# Patient Record
Sex: FEMALE | Race: WHITE | NOT HISPANIC OR LATINO | URBAN - METROPOLITAN AREA
[De-identification: names, ages, dates, MRNs, and addresses within clinical notes are randomized per-mention and may not be internally consistent; named-entity substitution may affect disease eponyms.]

---

## 2021-08-17 ENCOUNTER — OFFICE VISIT - RIVER FALLS (OUTPATIENT)
Dept: FAMILY MEDICINE | Facility: CLINIC | Age: 21
End: 2021-08-17

## 2021-08-17 ASSESSMENT — MIFFLIN-ST. JEOR: SCORE: 1368.59

## 2021-09-02 ENCOUNTER — COMMUNICATION - RIVER FALLS (OUTPATIENT)
Dept: FAMILY MEDICINE | Facility: CLINIC | Age: 21
End: 2021-09-02

## 2022-02-11 VITALS
BODY MASS INDEX: 24.63 KG/M2 | RESPIRATION RATE: 16 BRPM | DIASTOLIC BLOOD PRESSURE: 78 MMHG | WEIGHT: 139 LBS | OXYGEN SATURATION: 96 % | HEIGHT: 63 IN | TEMPERATURE: 97.3 F | SYSTOLIC BLOOD PRESSURE: 109 MMHG | HEART RATE: 72 BPM

## 2022-02-16 NOTE — PROGRESS NOTES
Patient:   ARCADIO VEGA            MRN: [id number]            FIN: [id number]               Age:   21 years     Sex:  Female     :  2000   Associated Diagnoses:   Routine sports physical exam   Author:   Boaz Zafar PA-C      Results Review   General results      Health Status   Allergies:    Allergic Reactions (Selected)  No Known Medication Allergies   Medications:  (Selected)   , not on any regular medications   Problem list:    All Problems  Resolved: History of COVID-19 / SNOMED CT [id number]      Chief Complaint   2021 2:48 PM CDT    New pt here for Wrestling at [facility] in [facility]      Subjective   Here for sports physical for wrestling at [facility]  she is from [place], no immunization records are available  she did have Covid-19 in 2020, lasted 3 days, no lingering sxs         Histories   Past Medical History:    Resolved  History of COVID-19 ([id number]):  Resolved.   Family History:    Entire family history is negative.   Procedure history:    History of tonsillectomy ([id number]).  History of shoulder surgery ([id number]).  Comments:  2021 2:56 PM CDT - Aaron LANDRUM, Alex  Left shoulder   Social History:        Electronic Cigarette/Vaping Assessment            Electronic Cigarette Use: Never.      Tobacco Assessment            Never (less than 100 in lifetime)        Objective   Vital Signs   2021 2:48 PM CDT Temperature Tympanic 97.3 DegF  LOW    Peripheral Pulse Rate 72 bpm    Pulse Site Radial artery    Respiratory Rate 16 br/min    Systolic Blood Pressure 109 mmHg    Diastolic Blood Pressure 78 mmHg    Mean Arterial Pressure 88 mmHg    BP Site Right arm    Oxygen Saturation 96 %      Measurements from flowsheet : Measurements   2021 2:48 PM CDT Height Measured - Standard 63.25 in    Weight Measured - Standard 139 lb    BSA 1.68 m2    Body Mass Index 24.43 kg/m2      General:  No acute distress.    Eye:  Pupils are equal, round and  reactive to light, Extraocular movements are intact, visual acuity is normal.    HENT:  Tympanic membranes are clear, No pharyngeal erythema, No sinus tenderness, cauliflower right ear.    Neck:  Supple, Non-tender, No lymphadenopathy.    Respiratory:  Lungs are clear to auscultation.    Cardiovascular:  Normal rate, Regular rhythm, No murmur.    Gastrointestinal:  Soft, Non-tender, Non-distended, Normal bowel sounds, No organomegaly.    Musculoskeletal:  Normal range of motion.    Neurologic:  Cranial Nerves II-XII are grossly intact, Normal deep tendon reflexes.    Psychiatric:  Appropriate mood & affect.       Assessment   .      Plan   Impression and Plan:     Diagnosis     Routine sports physical exam ([id number]-FX Z02.5).     .    Condition normal sports physical, approved for 2 years without restrictions.    Orders     Orders   Charges (Evaluation and Management):  [id number] initial preventive medicine new pt age 18-39yrs (Charge) (Order): Quantity: 1, Routine sports physical exam.     .

## 2022-02-16 NOTE — NURSING NOTE
Depression Screening Entered On:  8/17/2021 3:46 PM CDT    Performed On:  8/17/2021 3:46 PM CDT by Alex Walker CMA               Depression Screening   Little Interest - Pleasure in Activities :   Not at all   Feeling Down, Depressed, Hopeless :   Not at all   Initial Depression Screen Score :   0 Score   Poor Appetite or Overeating :   Not at all   Trouble Falling or Staying Asleep :   Not at all   Feeling Tired or Little Energy :   Not at all   Feeling Bad About Yourself :   Not at all   Trouble Concentrating :   Not at all   Moving or Speaking Slowly :   Not at all   Thoughts Better Off Dead or Hurting Self :   Not at all   Difficulty at Work, Home, Getting Along :   Not difficult at all   Detailed Depression Screen Score :   0    Total Depression Screen Score :   0    Alex Walker CMA - 8/17/2021 3:46 PM CDT

## 2022-02-16 NOTE — TELEPHONE ENCOUNTER
---------------------  From: Padmini Weber RN (Phone Messages Pool ([id number]))   To: [employer] Message Pool ([address] [place] [address]);     Sent: 9/2/2021 12:13:10 PM CDT  Subject: sickle cell results       PCP:   YOLANDA     Time of Call: 12:05pm       Person Calling:  pt  Phone number:  [phone]    Note: Pt called, requesting results to sickle cell screen. Pt stated she cannot practice with her team until she has results. Pt has tried calling [facility] but has not been able to speak with someone.   Lab order sent on 8/24/21.  Please advise.     Last office visit and reason: 8/17/21 sport physical GEORGIEhis is on outside lab that pt requested we send orders too.  I have not seen any lab results,  Ad have you seen results?  Alex Walker CMA---------------------  From: Alex Walker CMA ([facility] Message Pool ([id number]))   To: Boaz Zafar PA-C;     Sent: 9/2/2021 12:17:14 PM CDT  Subject: FW: sickle cell results---------------------  From: Boaz Zafar PA-C   To: [facility] Message Pool ([id number]);     Sent: 9/2/2021 12:34:21 PM CDT  Subject: RE: sickle cell results     I have not seen any results.  Can you check with the lab and see if they can find them?I called pt.  No answer and unable to leave message.  [phone]. [facility] is an outside reference lab that is not affiliated with [employer] or [employer].  Alex LEAVITT called pt again.  No answer and unable to leave message.  I do not have telephone number for [facility].  The Pt asked us to fax order to them on 8/24.  There was no form from [facility] stating we would be receive these results.  Alex LEAVITT called pt again, no answer and unable to leave message.  Alex LEAVITT called pt again.  No answer and unable to leave message.  [phone].  I have attempted to contact this pt 4 different times with no reply.  I am closing this encounter.  Alex Walker CMA

## 2022-02-16 NOTE — NURSING NOTE
Comprehensive Intake Entered On:  8/17/2021 3:00 PM CDT    Performed On:  8/17/2021 2:48 PM CDT by Alex Walker CMA               Summary   Chief Complaint :   New pt here for Wrestling at [facility]   Weight Measured :   139 lb(Converted to: 139 lb 0 oz, 63.049 kg)    Height Measured :   63.25 in(Converted to: 5 ft 3 in, 160.65 cm)    Body Mass Index :   24.43 kg/m2   Body Surface Area :   1.68 m2   Systolic Blood Pressure :   109 mmHg   Diastolic Blood Pressure :   78 mmHg   Mean Arterial Pressure :   88 mmHg   Peripheral Pulse Rate :   72 bpm   BP Site :   Right arm   Pulse Site :   Radial artery   Temperature Tympanic :   97.3 DegF(Converted to: 36.3 DegC)  (LOW)    Respiratory Rate :   16 br/min   Oxygen Saturation :   96 %   Alex Walker CMA - 8/17/2021 2:48 PM CDT   Health Status   Allergies Verified? :   Yes   Medication History Verified? :   Yes   Medical History Verified? :   Yes   Pre-Visit Planning Status :   Not completed   Tobacco Use? :   Never smoker   Alex Walker CMA - 8/17/2021 2:48 PM CDT   Meds / Allergies   (As Of: 8/17/2021 3:00:55 PM CDT)   Allergies (Active)   No Known Medication Allergies  Estimated Onset Date:   Unspecified ; Created By:   Alex Walker CMA; Reaction Status:   Active ; Category:   Drug ; Substance:   No Known Medication Allergies ; Type:   Allergy ; Updated By:   Alex Walker CMA; Reviewed Date:   8/17/2021 2:55 PM CDT        Medication List   (As Of: 8/17/2021 3:00:55 PM CDT)        Vision Testing POC   Corrective Lenses :   None   Eye, Left Visual Acuity :   20/13   Eye, Right Visual Acuity :   20/20   Eye, Bilateral Visual Acuity :   20/20   Alex Walker CMA - 8/17/2021 2:48 PM CDT   Procedures / Surgeries        -    Procedure History   (As Of: 8/17/2021 3:00:55 PM CDT)     Anesthesia Minutes:   0 ; Procedure Name:   History of tonsillectomy ; Procedure Minutes:   0 ; Last Reviewed Dt/Tm:   8/17/2021 2:56:19 PM CDT            Anesthesia  Minutes:   0 ; Procedure Name:   History of shoulder surgery ; Procedure Minutes:   0 ; Comments:     8/17/2021 2:56 PM CDT - Alex Walker CMA  Left shoulder ; Last Reviewed Dt/Tm:   8/17/2021 2:56:41 PM CDT            Family History   Family History   (As Of: 8/17/2021 3:00:55 PM CDT)   Negative History     Social History   Social History   (As Of: 8/17/2021 3:00:55 PM CDT)   Tobacco:        Never (less than 100 in lifetime)   (Last Updated: 8/17/2021 2:55:28 PM CDT by Alex Walker CMA)          Electronic Cigarette/Vaping:        Electronic Cigarette Use: Never.   (Last Updated: 8/17/2021 2:55:31 PM CDT by Alex Walker CMA)

## 2022-02-16 NOTE — TELEPHONE ENCOUNTER
---------------------  From: Armando/Aretha ATKINS (Phone Messages Pool ([id number]))   To: [facility] Message Pool ([address] [place] [address]);     Sent: 8/24/2021 12:18:20 PM CDT  Subject: General Message     Phone Message    PCP: YOLANDA    Time of Call: 1210    Phone Number: [phone]    Returned call at: n/a    Note: Patient calls stating that she just had a sports px with [facility]. She states that her college, [employer], is requiring that she has a sickle cell lab test done as this is a requirement to attend this college and play sports. Patient wanting this order faxed to [facility] at [phone].    Please advise and call pt once this has been done.---------------------  From: Alex Walker CMA ([facility] Message Pool ([facility]))   To: Boaz Zafar PA-C;     Sent: 8/24/2021 12:45:57 PM CDT  Subject: FW: General Message---------------------  From: Boaz Zafar PA-C   To: [facility] Message Pool ([facility]);     Sent: 8/24/2021 1:24:50 PM CDT  Subject: RE: General Message     okay, please order lab and fax as needed  ** Submitted: **  Order:Sickle cell screen* ([facility])  Details:  Specimen Type: Blood, Collection Date: 8/24/2021 1:31 PM CDT         Signed by Alex Walker CMA  8/24/2021 6:31:00 PM UTCLob order for sickle cell screen faxed to [facility] per [facility] orders [phone].  Confirmation received.  I called pt, No answer and unable to leave message.  Pt voicemail is not set up yet.  [phone]  Alex LEAVITT called pt again.  No answer and unable to leave message.  Alex Walker CMAPt called  back at 4:53pm and I notified her of msg.noted.  Alex Walker CMApatient called requesting a copy of the lab when we get it. Please bring to [facility] once received. I will call patient to get copy once she signs [facility]. thanksPt calling asking if sickle cell result has been received. Told her there is nothing scanned into her chart yet.

## 2022-02-16 NOTE — NURSING NOTE
CAGE Assessment Entered On:  8/17/2021 3:46 PM CDT    Performed On:  8/17/2021 3:46 PM CDT by Alex Walker CMA               Assessment   Have you ever felt you should cut down on your drinking :   No   Have people annoyed you by criticizing your drinking :   No   Have you ever felt bad or guilty about your drinking :   No   Have you ever taken a drink first thing in the morning to steady your nerves or get rid of a hangover ([employer]) :   No   CAGE Score :   0    Alex Walker CMA - 8/17/2021 3:46 PM CDT